# Patient Record
(demographics unavailable — no encounter records)

---

## 2025-05-27 NOTE — HISTORY OF PRESENT ILLNESS
[FreeTextEntry1] : 69F w/ no sig PMH here for cp eval   s/p EST- 10.1 METS, mild STD w/ peak exercise. no cp  NST 5/27 probably normal perfusion  ECHO 5/27 normal EF, mild valvular heart disease   Assessment and Plan 1. Cp   2. HTN 3. HLD   -rec cont asa 81 mg qd  -cont atorva 20 mg qd  -reassured pt, rec yearly f/u    Dorothy Patiño MD FACJames B. [facility] Interventional Cardiology Attending [facility] Tel: [phone] Mobile: [phone] - Jong Go: [email or web address]